# Patient Record
Sex: FEMALE
[De-identification: names, ages, dates, MRNs, and addresses within clinical notes are randomized per-mention and may not be internally consistent; named-entity substitution may affect disease eponyms.]

---

## 2020-09-09 ENCOUNTER — APPOINTMENT (OUTPATIENT)
Dept: UROLOGY | Facility: CLINIC | Age: 70
End: 2020-09-09
Payer: MEDICARE

## 2020-09-09 VITALS
WEIGHT: 126 LBS | HEIGHT: 61 IN | SYSTOLIC BLOOD PRESSURE: 111 MMHG | DIASTOLIC BLOOD PRESSURE: 80 MMHG | HEART RATE: 96 BPM | TEMPERATURE: 97.6 F | BODY MASS INDEX: 23.79 KG/M2

## 2020-09-09 DIAGNOSIS — Z00.00 ENCOUNTER FOR GENERAL ADULT MEDICAL EXAMINATION W/OUT ABNORMAL FINDINGS: ICD-10-CM

## 2020-09-09 DIAGNOSIS — R35.0 FREQUENCY OF MICTURITION: ICD-10-CM

## 2020-09-09 LAB
BILIRUB UR QL STRIP: NORMAL
CLARITY UR: CLEAR
COLLECTION METHOD: NORMAL
GLUCOSE UR-MCNC: NORMAL
HCG UR QL: 0.2 EU/DL
HGB UR QL STRIP.AUTO: NORMAL
KETONES UR-MCNC: NORMAL
LEUKOCYTE ESTERASE UR QL STRIP: NORMAL
NITRITE UR QL STRIP: NORMAL
PH UR STRIP: 6
PROT UR STRIP-MCNC: NORMAL
SP GR UR STRIP: 1.02

## 2020-09-09 PROCEDURE — 51798 US URINE CAPACITY MEASURE: CPT

## 2020-09-09 PROCEDURE — 81003 URINALYSIS AUTO W/O SCOPE: CPT | Mod: QW

## 2020-09-09 PROCEDURE — 99204 OFFICE O/P NEW MOD 45 MIN: CPT | Mod: 25

## 2020-09-09 RX ORDER — METHENAMINE, SODIUM PHOSPHATE, MONOBASIC, MONOHYDRATE, PHENYL SALICYLATE, METHYLENE BLUE, AND HYOSCYAMINE SULFATE 118; 40.8; 36; 10; .12 MG/1; MG/1; MG/1; MG/1; MG/1
118 CAPSULE ORAL 4 TIMES DAILY
Qty: 120 | Refills: 6 | Status: ACTIVE | COMMUNITY
Start: 2020-09-09 | End: 1900-01-01

## 2020-09-10 RX ORDER — METHENAMINE, SODIUM PHOSPHATE, MONOBASIC, ANHYDROUS, PHENYL SALICYLATE, METHYLENE BLUE AND HYOSCYAMINE SULFATE 118; 40.8; 36; 10; .12 MG/1; MG/1; MG/1; MG/1; MG/1
118 CAPSULE ORAL 4 TIMES DAILY
Qty: 120 | Refills: 0 | Status: ACTIVE | COMMUNITY
Start: 2020-09-10 | End: 1900-01-01

## 2020-09-10 NOTE — HISTORY OF PRESENT ILLNESS
[FreeTextEntry1] : Pt is a 71 yo female last seen by me in 2017 for intermittent bladder symptoms which is managed well with occasional uribel use.  She reports increased urinary frequency of late, especially in the morning.  \par She has not had a urinary tract infection in years.   Her symptoms are intermittent.  Admits to decreased mobility/activity in the setting of COVID19. \par \par Sexually active with .  \par \par Saw Dr. Valenzuela who renewed Rx in 2018.\par \par PMH: HTN, GERD, Hyperchol, osteopenia\par PSH: hemorrhoidectomy 2018\par SH:  never smoked, weekly EtOH\par FH:  no  malignancy, no stones \par

## 2020-09-10 NOTE — PHYSICAL EXAM
[General Appearance - Well Developed] : well developed [General Appearance - Well Nourished] : well nourished [General Appearance - In No Acute Distress] : no acute distress [Well Groomed] : well groomed [Normal Appearance] : normal appearance [Edema] : no peripheral edema [Exaggerated Use Of Accessory Muscles For Inspiration] : no accessory muscle use [Abdomen Soft] : soft [Respiration, Rhythm And Depth] : normal respiratory rhythm and effort [Abdomen Tenderness] : non-tender [Costovertebral Angle Tenderness] : no ~M costovertebral angle tenderness [Urinary Bladder Findings] : the bladder was normal on palpation [] : no rash [FreeTextEntry1] : no prolapse  [Normal Station and Gait] : the gait and station were normal for the patient's age [No Focal Deficits] : no focal deficits [Affect] : the affect was normal [Oriented To Time, Place, And Person] : oriented to person, place, and time [Mood] : the mood was normal [Not Anxious] : not anxious [No Palpable Adenopathy] : no palpable adenopathy

## 2020-09-10 NOTE — ASSESSMENT
[FreeTextEntry1] : 70 year old female with intermittent bladder symptoms, historically managed with Uribel alone.  Discussed pelvic floor and possible role in recent flare.  Gave referral and list of providers.  Renewed Uribel.  Gave referral to colorectal specialist to evaluate for hemorrhoids, (Dr. Alisia Mooney).  Sent urine for culture.

## 2020-09-10 NOTE — LETTER BODY
[Dear  ___] : Dear  [unfilled], [Consult Letter:] : I had the pleasure of evaluating your patient, [unfilled]. [Consult Closing:] : Thank you very much for allowing me to participate in the care of this patient.  If you have any questions, please do not hesitate to contact me. [Please see my note below.] : Please see my note below. [Sincerely,] : Sincerely, [FreeTextEntry3] : Dr. Tegan Denney\par

## 2020-09-15 LAB — BACTERIA UR CULT: NORMAL

## 2020-10-29 ENCOUNTER — APPOINTMENT (OUTPATIENT)
Dept: UROLOGY | Facility: CLINIC | Age: 70
End: 2020-10-29

## 2020-11-12 ENCOUNTER — APPOINTMENT (OUTPATIENT)
Dept: UROLOGY | Facility: CLINIC | Age: 70
End: 2020-11-12
Payer: MEDICARE

## 2020-11-12 PROCEDURE — 99214 OFFICE O/P EST MOD 30 MIN: CPT | Mod: 95

## 2020-11-12 NOTE — HISTORY OF PRESENT ILLNESS
[Other Location: e.g. School (Enter Location, City,State)___] : at [unfilled], at the time of the visit. [Medical Office: ([facility])___] : at the medical office located in  [Verbal consent obtained from patient] : the patient, [unfilled] [FreeTextEntry1] : Pt is a 69 yo female last seen by me in 2017 for intermittent bladder symptoms which was managed well with occasional uribel use.  She reports increased urinary frequency of late, especially in the morning.  \par She has not had a urinary tract infection in years.   Her symptoms are intermittent.  Admits to decreased mobility/activity in the setting of COVID19.   I saw her in September and she has since started Pelvic Floor PT.    She has had 2 sessions and is currently working on avoiding bladder irritants.   \par \par Sexually active with .  \par \par Saw Dr. Valenzuela who renewed Rx in 2018.\par \par PMH: HTN, GERD, Hyperchol, osteopenia\par PSH: hemorrhoidectomy 2018\par SH:  never smoked, weekly EtOH\par FH:  no  malignancy, no stones \par

## 2020-11-12 NOTE — PHYSICAL EXAM

## 2020-11-12 NOTE — ASSESSMENT
[FreeTextEntry1] : 70 year old female with intermittent bladder symptoms, historically managed with Uribel alone.  Urine culture was negative at last visit. She has started with PT.  We discussed options including medication options and Botox if she still is uncomfortable after 6-8 weeks of PT.   Of note, she has relatively new onset of tachycardia and is Metoprolol.  She will see if Trospium is covered by her insurance.  If she is no better in 6-8 weeks, she will come for a cystoscopy and a further discussion regarding her options.

## 2020-11-12 NOTE — LETTER BODY
[Dear  ___] : Dear  [unfilled], [Consult Letter:] : I had the pleasure of evaluating your patient, [unfilled]. [Please see my note below.] : Please see my note below. [Consult Closing:] : Thank you very much for allowing me to participate in the care of this patient.  If you have any questions, please do not hesitate to contact me. [Sincerely,] : Sincerely, [FreeTextEntry3] : Dr. Tegan Denney\par

## 2021-01-05 ENCOUNTER — NON-APPOINTMENT (OUTPATIENT)
Age: 71
End: 2021-01-05

## 2021-01-08 ENCOUNTER — NON-APPOINTMENT (OUTPATIENT)
Age: 71
End: 2021-01-08

## 2021-02-03 ENCOUNTER — APPOINTMENT (OUTPATIENT)
Dept: UROLOGY | Facility: CLINIC | Age: 71
End: 2021-02-03
Payer: MEDICARE

## 2021-02-03 VITALS — OXYGEN SATURATION: 99 % | DIASTOLIC BLOOD PRESSURE: 83 MMHG | HEART RATE: 92 BPM | SYSTOLIC BLOOD PRESSURE: 113 MMHG

## 2021-02-03 VITALS — TEMPERATURE: 97.4 F

## 2021-02-03 LAB
BILIRUB UR QL STRIP: NORMAL
CLARITY UR: CLEAR
COLLECTION METHOD: NORMAL
GLUCOSE UR-MCNC: NORMAL
HCG UR QL: 0.2 EU/DL
HGB UR QL STRIP.AUTO: NORMAL
KETONES UR-MCNC: NORMAL
LEUKOCYTE ESTERASE UR QL STRIP: NORMAL
NITRITE UR QL STRIP: NORMAL
PH UR STRIP: 5.5
PROT UR STRIP-MCNC: NORMAL
SP GR UR STRIP: 1.02

## 2021-02-03 PROCEDURE — 52000 CYSTOURETHROSCOPY: CPT

## 2021-02-03 PROCEDURE — 81003 URINALYSIS AUTO W/O SCOPE: CPT | Mod: QW

## 2021-02-03 PROCEDURE — 99214 OFFICE O/P EST MOD 30 MIN: CPT | Mod: 25

## 2021-02-03 NOTE — HISTORY OF PRESENT ILLNESS
[FreeTextEntry1] : Pt is a 71 yo female last seen by me in 2017 for intermittent bladder symptoms which was managed well with occasional uribel use.  I then saw her virtually in 11/20 as she had increasing symptoms.  She has been working with PFPT and she has seen significant decrease in frequency (from 16 times per day- 8 times per day).  However, she still feels urgency daily.  Nocturia x 1-2. \par \par She has not had a urinary tract infection in years.\par \par  Admits to decreased mobility/activity in the setting of COVID19.   \par \par Sexually active with .  \par \par Saw Dr. Valenzuela who renewed Rx in 2018.\par \par PMH: HTN, GERD, Hyperchol, osteopenia\par PSH: hemorrhoidectomy 2018\par SH:  never smoked, weekly EtOH\par FH:  no  malignancy, no stones \par

## 2021-02-03 NOTE — ASSESSMENT
[FreeTextEntry1] : 70 year old female with intermittent bladder symptoms, historically managed with Uribel alone.  Urine culture was negative at last visit. She has worked with PT for the last 3 months and is still bothered by symptoms daily.  \par I performed a cystoscopy today which was completely normal.  I have suggested a trial of OAB medication and she will investigate if Trospium is covered by her insurance.  \par \par Suggest continued pelvic floor PT/NSAIDS as there may be a component of PF spasm.

## 2021-02-03 NOTE — PHYSICAL EXAM
[General Appearance - Well Nourished] : well nourished [General Appearance - Well Developed] : well developed [Normal Appearance] : normal appearance [Well Groomed] : well groomed [General Appearance - In No Acute Distress] : no acute distress [Abdomen Soft] : soft [Abdomen Tenderness] : non-tender [Costovertebral Angle Tenderness] : no ~M costovertebral angle tenderness [Urinary Bladder Findings] : the bladder was normal on palpation [Edema] : no peripheral edema [] : no respiratory distress [Respiration, Rhythm And Depth] : normal respiratory rhythm and effort [Exaggerated Use Of Accessory Muscles For Inspiration] : no accessory muscle use [Oriented To Time, Place, And Person] : oriented to person, place, and time [Affect] : the affect was normal [Mood] : the mood was normal [Not Anxious] : not anxious [Normal Station and Gait] : the gait and station were normal for the patient's age [No Focal Deficits] : no focal deficits [No Palpable Adenopathy] : no palpable adenopathy [FreeTextEntry1] : no prolapse

## 2021-02-05 LAB — BACTERIA UR CULT: NORMAL

## 2021-02-26 ENCOUNTER — NON-APPOINTMENT (OUTPATIENT)
Age: 71
End: 2021-02-26

## 2021-03-02 RX ORDER — METHENAMINE, SODIUM PHOSPHATE, MONOBASIC, ANHYDROUS, PHENYL SALICYLATE, METHYLENE BLUE AND HYOSCYAMINE SULFATE 118; 40.8; 36; 10; .12 MG/1; MG/1; MG/1; MG/1; MG/1
118 CAPSULE ORAL 4 TIMES DAILY
Qty: 120 | Refills: 3 | Status: ACTIVE | COMMUNITY
Start: 2021-02-26 | End: 1900-01-01

## 2023-04-25 ENCOUNTER — APPOINTMENT (OUTPATIENT)
Dept: NUCLEAR MEDICINE | Facility: IMAGING CENTER | Age: 73
End: 2023-04-25
Payer: MEDICARE

## 2023-04-25 ENCOUNTER — OUTPATIENT (OUTPATIENT)
Dept: OUTPATIENT SERVICES | Facility: HOSPITAL | Age: 73
LOS: 1 days | End: 2023-04-25
Payer: MEDICARE

## 2023-04-25 DIAGNOSIS — Z00.8 ENCOUNTER FOR OTHER GENERAL EXAMINATION: ICD-10-CM

## 2023-04-25 PROCEDURE — 78999 UNLISTED MISC PX DX NUC MED: CPT | Mod: MH

## 2023-04-25 PROCEDURE — 78608 BRAIN IMAGING (PET): CPT | Mod: 26,MH

## 2023-04-25 PROCEDURE — 78999 UNLISTED MISC PX DX NUC MED: CPT | Mod: 26,MH

## 2023-04-25 PROCEDURE — 78608 BRAIN IMAGING (PET): CPT | Mod: MH

## 2023-04-25 PROCEDURE — A9552: CPT

## 2023-05-02 ENCOUNTER — TRANSCRIPTION ENCOUNTER (OUTPATIENT)
Age: 73
End: 2023-05-02